# Patient Record
Sex: MALE | Race: WHITE | Employment: OTHER | URBAN - NONMETROPOLITAN AREA
[De-identification: names, ages, dates, MRNs, and addresses within clinical notes are randomized per-mention and may not be internally consistent; named-entity substitution may affect disease eponyms.]

---

## 2020-07-07 ENCOUNTER — HOSPITAL ENCOUNTER (OUTPATIENT)
Dept: NURSING | Age: 75
Discharge: HOME | End: 2020-07-07
Payer: MEDICARE

## 2020-07-07 VITALS
HEART RATE: 67 BPM | DIASTOLIC BLOOD PRESSURE: 79 MMHG | RESPIRATION RATE: 18 BRPM | TEMPERATURE: 97.5 F | SYSTOLIC BLOOD PRESSURE: 138 MMHG

## 2020-07-07 PROCEDURE — 36000 PLACE NEEDLE IN VEIN: CPT

## 2020-07-07 ASSESSMENT — PAIN - FUNCTIONAL ASSESSMENT: PAIN_FUNCTIONAL_ASSESSMENT: 0-10

## 2020-07-07 NOTE — PROGRESS NOTES
___m_ Safety:       (Environmental)   [place] to environment   Ensure ID band is correct and in place/ allergy band as needed   Assess for fall risk   Initiate fall precautions as applicable (fall band, side rails, etc.)   Call light within reach   Bed in low position/ wheels locked    __m__ Pain:        Assess pain level and characteristics   Administer analgesics as ordered   Assess effectiveness of pain management and report to MD as needed    ___m_ Knowledge Deficit:   Assess baseline knowledge   Provide teaching at level of understanding   Provide teaching via preferred learning method   Evaluate teaching effectiveness    _m___ Hemodynamic/Respiratory Status:       (Pre and Post Procedure Monitoring)   Assess/Monitor vital signs and LOC   Assess Baseline SpO2 prior to any sedation   Obtain weight/height   Assess vital signs/ LOC until patient meets discharge criteria   Monitor procedure site and notify MD of any issues    _

## 2020-07-07 NOTE — PROGRESS NOTES
Patient admitted to room endo 2 for INT insertion, vitals are stable. Patient offered rights and responsibilities.

## 2021-12-22 ENCOUNTER — HOSPITAL ENCOUNTER (OUTPATIENT)
Dept: CT IMAGING | Age: 76
Discharge: HOME | End: 2021-12-22

## 2021-12-22 ENCOUNTER — INITIAL CONSULT (OUTPATIENT)
Dept: NEUROLOGY | Age: 76
End: 2021-12-22
Payer: MEDICARE

## 2021-12-22 VITALS
SYSTOLIC BLOOD PRESSURE: 124 MMHG | WEIGHT: 186 LBS | BODY MASS INDEX: 29.89 KG/M2 | OXYGEN SATURATION: 98 % | DIASTOLIC BLOOD PRESSURE: 76 MMHG | HEIGHT: 66 IN | HEART RATE: 68 BPM

## 2021-12-22 DIAGNOSIS — R26.89 BALANCE PROBLEM: ICD-10-CM

## 2021-12-22 DIAGNOSIS — R27.8 SENSORY ATAXIA: ICD-10-CM

## 2021-12-22 DIAGNOSIS — Z00.6 EXAMINATION FOR NORMAL COMPARISON FOR CLINICAL RESEARCH: ICD-10-CM

## 2021-12-22 DIAGNOSIS — R29.6 FREQUENT FALLS: Primary | ICD-10-CM

## 2021-12-22 DIAGNOSIS — R29.2 HYPERREFLEXIA: ICD-10-CM

## 2021-12-22 PROCEDURE — G8417 CALC BMI ABV UP PARAM F/U: HCPCS | Performed by: PSYCHIATRY & NEUROLOGY

## 2021-12-22 PROCEDURE — 99204 OFFICE O/P NEW MOD 45 MIN: CPT | Performed by: PSYCHIATRY & NEUROLOGY

## 2021-12-22 PROCEDURE — G8427 DOCREV CUR MEDS BY ELIG CLIN: HCPCS | Performed by: PSYCHIATRY & NEUROLOGY

## 2021-12-22 PROCEDURE — 4040F PNEUMOC VAC/ADMIN/RCVD: CPT | Performed by: PSYCHIATRY & NEUROLOGY

## 2021-12-22 PROCEDURE — 1123F ACP DISCUSS/DSCN MKR DOCD: CPT | Performed by: PSYCHIATRY & NEUROLOGY

## 2021-12-22 PROCEDURE — 1036F TOBACCO NON-USER: CPT | Performed by: PSYCHIATRY & NEUROLOGY

## 2021-12-22 PROCEDURE — G8484 FLU IMMUNIZE NO ADMIN: HCPCS | Performed by: PSYCHIATRY & NEUROLOGY

## 2021-12-22 RX ORDER — FAMOTIDINE 20 MG/1
20 TABLET, FILM COATED ORAL NIGHTLY
COMMUNITY

## 2021-12-22 RX ORDER — EZETIMIBE 10 MG/1
10 TABLET ORAL DAILY
COMMUNITY
Start: 2021-03-26

## 2021-12-22 RX ORDER — ROSUVASTATIN CALCIUM 40 MG/1
40 TABLET, COATED ORAL DAILY
COMMUNITY

## 2021-12-22 RX ORDER — MIDODRINE HYDROCHLORIDE 10 MG/1
TABLET ORAL
COMMUNITY
Start: 2021-11-15

## 2021-12-22 RX ORDER — TAMSULOSIN HYDROCHLORIDE 0.4 MG/1
0.4 CAPSULE ORAL DAILY
COMMUNITY
Start: 2021-03-28

## 2021-12-22 RX ORDER — ALBUTEROL SULFATE 2.5 MG/3ML
2.5 SOLUTION RESPIRATORY (INHALATION) EVERY 6 HOURS PRN
COMMUNITY

## 2021-12-22 RX ORDER — OMEPRAZOLE 20 MG/1
20 CAPSULE, DELAYED RELEASE ORAL DAILY
COMMUNITY
Start: 2021-03-26

## 2021-12-22 RX ORDER — RANOLAZINE 500 MG/1
500 TABLET, EXTENDED RELEASE ORAL
COMMUNITY
Start: 2021-03-19

## 2021-12-22 RX ORDER — ASPIRIN 81 MG/1
81 TABLET ORAL DAILY
COMMUNITY

## 2021-12-22 RX ORDER — VENLAFAXINE HYDROCHLORIDE 150 MG/1
150 CAPSULE, EXTENDED RELEASE ORAL DAILY
COMMUNITY
Start: 2021-03-28

## 2021-12-22 RX ORDER — POLYETHYLENE GLYCOL 3350 17 G/17G
17 POWDER, FOR SOLUTION ORAL DAILY
COMMUNITY

## 2021-12-22 RX ORDER — FERROUS SULFATE 325(65) MG
325 TABLET ORAL
COMMUNITY

## 2021-12-22 RX ORDER — AMIODARONE HYDROCHLORIDE 200 MG/1
TABLET ORAL
COMMUNITY
Start: 2021-11-09

## 2021-12-22 RX ORDER — FENOFIBRATE 160 MG/1
160 TABLET ORAL DAILY
COMMUNITY
Start: 2021-03-28

## 2021-12-22 RX ORDER — UBIDECARENONE 100 MG
100 CAPSULE ORAL DAILY
COMMUNITY

## 2021-12-22 RX ORDER — BENZONATATE 200 MG/1
CAPSULE ORAL
COMMUNITY
Start: 2021-11-16 | End: 2022-02-25

## 2021-12-22 RX ORDER — NITROGLYCERIN 0.4 MG/1
0.4 TABLET SUBLINGUAL
COMMUNITY

## 2021-12-22 NOTE — PATIENT INSTRUCTIONS
1. Obtain CT head WO contrast images and heart monitor  from [facility]  2. MRI cervical spine WO contrast  3. Tilt table test  4. Vitamin B12, folate  5. Vitamin B6 level  6. Follow through with physical therapy recommendations  7. Call with any new symptoms or concerns. 8. Follow up in 6 weeks.

## 2021-12-28 NOTE — PROGRESS NOTES
Chief Complaint   Patient presents with    Consultation     unsteady gait, falls       Francisco Javier Hsieh is a 67 y.o. male who presents today for evaluation of frequent falls for the past 6 months. When he falls he can fall in all directions. He denies any neck pain. He is not diabetic. No back pain. He denies any dizziness. CT head done at [facility] were normal per patient report. He has been using the walker for the past 1 month and has not fallen when he uses the walker. Last fall was 1 month ago. He denies chest pain. No shortness of breath, no neck pain. He has history of atrial fibrillation, however was taken off his eliquis due to frequent falls. No vision changes. No dysphagia. No fever. No rash. No weight loss. History provided by patient accompanied by his wife. Past Medical History:   Diagnosis Date    COPD (chronic obstructive pulmonary disease) ([facility] [place] [address]     Depression     Hypertension     Prostate cancer ([facility] [place] [address]        There is no problem list on file for this patient.       Allergies   Allergen Reactions    Losartan Other (See Comments)     cough       Current Outpatient Medications   Medication Sig Dispense Refill    PACERONE 200 MG tablet       ascorbic acid (VITAMIN C) 1000 MG tablet Take 1,000 mg by mouth daily      aspirin 81 MG EC tablet Take 81 mg by mouth daily      rosuvastatin (CRESTOR) 40 MG tablet Take 40 mg by mouth daily      benzonatate (TESSALON) 200 MG capsule take 1 capsule by mouth every 8 hours if needed for cough for 10 days      coenzyme Q10 100 MG CAPS capsule Take 100 mg by mouth daily      ezetimibe (ZETIA) 10 MG tablet Take 10 mg by mouth daily      famotidine (PEPCID) 20 MG tablet Take 20 mg by mouth nightly      fenofibrate (TRIGLIDE) 160 MG tablet Take 160 mg by mouth daily      ferrous sulfate (IRON 325) 325 (65 Fe) MG tablet Take 325 mg by mouth daily (with breakfast)      tamsulosin (FLOMAX) 0.4 MG capsule Take 0.4 mg by mouth daily      venlafaxine (EFFEXOR XR) 150 MG extended release capsule Take 150 mg by mouth daily      ranolazine (RANEXA) 500 MG extended release tablet Take 500 mg by mouth      polyethylene glycol (GLYCOLAX) 17 GM/SCOOP powder Take 17 g by mouth daily      omeprazole (PRILOSEC) 20 MG delayed release capsule Take 20 mg by mouth daily      nitroGLYCERIN (NITROSTAT) 0.4 MG SL tablet Place 0.4 mg under the tongue      midodrine (PROAMATINE) 10 MG tablet take 1 tablet by mouth three times a day      albuterol (PROVENTIL) (2.5 MG/3ML) 0.083% nebulizer solution Take 2.5 mg by nebulization every 6 hours as needed for Wheezing      Chlorpheniramine-Acetaminophen (CORICIDIN HBP COLD/FLU PO) Take by mouth       No current facility-administered medications for this visit. Social History     Socioeconomic History    Marital status:      Spouse name: None    Number of children: None    Years of education: None    Highest education level: None   Occupational History    None   Tobacco Use    Smoking status: Former Smoker     Types: Pipe     Quit date: 1984     Years since quittin.9    Smokeless tobacco: Never Used   Vaping Use    Vaping Use: Never used   Substance and Sexual Activity    Alcohol use: Yes     Comment: socially     Drug use: Never    Sexual activity: Yes     Partners: Female   Other Topics Concern    None   Social History Narrative    None     Social Determinants of Health     Financial Resource Strain:     Difficulty of Paying Living Expenses: Not on file   Food Insecurity:     Worried About Running Out of Food in the Last Year: Not on file    [place] of Food in the Last Year: Not on file   Transportation Needs:     Lack of Transportation (Medical): Not on file    Lack of Transportation (Non-Medical):  Not on file   Physical Activity:     Days of Exercise per Week: Not on file    Minutes of Exercise per Session: Not on file   Stress:     Feeling of Stress : Not on file   Social Connections:  Frequency of Communication with Friends and Family: Not on file    Frequency of Social Gatherings with Friends and Family: Not on file    Attends Advent Services: Not on file    Active Member of Clubs or Organizations: Not on file    Attends Club or Organization Meetings: Not on file    Marital Status: Not on file   Intimate Partner Violence:     Fear of Current or Ex-Partner: Not on file    Emotionally Abused: Not on file    Physically Abused: Not on file    Sexually Abused: Not on file   Housing Stability:     Unable to Pay for Housing in the Last Year: Not on file    Number of [place] in the Last Year: Not on file    Unstable Housing in the Last Year: Not on file       Family History   Problem Relation Age of Onset    Depression Mother     Heart Disease Father     No Known Problems Sister     Cancer Brother         bladder         I reviewed the past medical history, allergies, medications, social history and family history. Review of Systems   All systems reviewed, and are all negative, except what is mentioned in HPI      Vitals:    12/22/21 1040   BP: 124/76   Site: Left Upper Arm   Position: Sitting   Cuff Size: Large Adult   Pulse: 68   SpO2: 98%   Weight: 186 lb (84.4 kg)   Height: 5' 6\" (1.676 m)       Physical Examination:  General appearance - alert, well appearing, and in no distress, oriented to person, place, and time and normal weight  Mental status- Level of Alertness: awake  Orientation: person, place, time  Memory: normal  Fund of Knowledge: normal  Attention/Concentration: normal  Language: normal. Mood is normal.   Neck - supple, no significant adenopathy, carotids upstroke normal bilaterally. There is no axillary lymphadenopathy. There is no carotid bruit. No neck lymphadenopathy.  No thyroid enlargement   Neurological -   Cranial [place]-OF-TWX:.   Cranial nerve II: Normal   Cranial nerve III: Pupils: equal, round, reactive to light  Cranial nerves III, IV, VI: Extraocular Movements: intact   Cranial nerve V: Facial sensation: intact   Cranial nerve VII:Facial strength: intact   Cranial nerve VIII: Hearing: intact   Cranial nerve IX: Palate Elevation intact bilaterally  Cranial nerve XI: Shoulder shrug intact bilaterally  Cranial nerve XII: Tongue midline   neck supple without rigidity, there is limitation of range of motion of the neck worse to the left. DTR's are brisk distal and symmetric  Babinski sign negative   Motor exam is 5/5 in the upper and lower extremities. Normal muscle tone. There is no muscle atrophy. Sensory is intact for light touch. Coordination: finger to nose,  intact  Gait and station intact   Abnormal movement none, vibration absent distally  Skin - warm, dry to touch, normal coloration, no rashes, no suspicious skin lesions  Superficial temporal artery pulses are normal.   There is no limitation of range of motion of the neck. There is no resting tremor, no pin rolling, no bradykinesia, no Hypohonia, normal blink rate. Musculoskeletal: Has no hand arthritis, no limitation of ROM in any of the four extremities. There is no leg edema. The Heart was regular in rate and rhythm. No heart murmur  Chest Clear, with  good effort. Abdomen soft, intact bowel sounds. We reviewed the patient records from referring provider and available information in the EHR       ASSESSMENT:      Diagnosis Orders   1. Frequent falls     2. Balance problem     3. Hyperreflexia     4. Sensory ataxia        This is a 67-year-old male who presents with frequent falls been ongoing for the past 6 months. When he falls he falls in all directions. I reviewed the CT Brain and agree with interpretation, I also reviewed the patient pertinent labs and records in the EHR and from other providers. CT head done at [facility] was normal per his report, we will obtain the report and images for further review.  On exam, he has reduced vibratory sensation, there is limited range of motion of the neck, and he is hyperreflexic. There may be an element of sensory ataxia contributing to his symptoms. We will arrange for him to undergo an MRI of the cervical spine without contrast to evaluate for cervical myelopathy as well as a tilt table test to screen for orthostatic hypotension/vasovagal syncope. After detailed discussion with the patient and his wife we agreed on the following plan. Plan    1. Obtain CT head WO contrast images and heart monitor  from [facility]  2. MRI cervical spine WO contrast  3. Tilt table test  4. Vitamin B12, folate  5. Vitamin B6 level  6. Follow through with physical therapy recommendations  7. Call with any new symptoms or concerns. 8. Follow up in 6 weeks.      Total time 62 min      Ellie Ibarra MD

## 2022-01-18 ENCOUNTER — HOSPITAL ENCOUNTER (OUTPATIENT)
Dept: NON INVASIVE DIAGNOSTICS | Age: 77
Discharge: HOME | End: 2022-01-18
Payer: MEDICARE

## 2022-01-18 ENCOUNTER — HOSPITAL ENCOUNTER (OUTPATIENT)
Dept: MRI IMAGING | Age: 77
Discharge: HOME | End: 2022-01-18
Payer: MEDICARE

## 2022-01-18 ENCOUNTER — TELEPHONE (OUTPATIENT)
Dept: NEUROLOGY | Age: 77
End: 2022-01-18

## 2022-01-18 VITALS — BODY MASS INDEX: 28.93 KG/M2 | WEIGHT: 180 LBS | HEIGHT: 66 IN

## 2022-01-18 DIAGNOSIS — R93.7 ABNORMAL MRI, CERVICAL SPINE: ICD-10-CM

## 2022-01-18 DIAGNOSIS — R29.2 HYPERREFLEXIA: ICD-10-CM

## 2022-01-18 DIAGNOSIS — R27.8 SENSORY ATAXIA: ICD-10-CM

## 2022-01-18 DIAGNOSIS — R26.89 BALANCE PROBLEM: ICD-10-CM

## 2022-01-18 DIAGNOSIS — R29.6 FREQUENT FALLS: ICD-10-CM

## 2022-01-18 DIAGNOSIS — R26.89 BALANCE PROBLEM: Primary | ICD-10-CM

## 2022-01-18 PROCEDURE — 93660 TILT TABLE EVALUATION: CPT | Performed by: INTERNAL MEDICINE

## 2022-01-18 PROCEDURE — 72141 MRI NECK SPINE W/O DYE: CPT

## 2022-01-18 NOTE — TELEPHONE ENCOUNTER
----- Message from MARIE Mcintyre - CNP sent at 1/18/2022 11:39 AM EST -----  Please let patient know he needs to be seen by spine specialist re: mri cervical spine showing C4-5, at which level there is moderate canal stenosis, cord compression, moderate to severe left and moderate right-sided foraminal stenosis. There is   increased signal intensity within the cervical spinal cord, just below this level consistent with myelomalacia secondary to chronic cord compression. Does he have a preference?   Pastor Schmidt, CNP

## 2022-01-18 NOTE — PROCEDURES
[address] [id number]                                TILT TABLE TEST    PATIENT NAME: Isai Lopez                   :        1945  MED REC NO:   [id number]                           ROOM:  ACCOUNT NO:   [de-identified]                           ADMIT DATE: 2022  PROVIDER:     Kali Armendariz MD    DATE OF STUDY:  2022    PROCEDURE:  Tilt table study. INDICATION FOR STUDY:  Presyncopal episodes. DESCRIPTION OF PROCEDURE:  After informed consent was obtained, the  patient was brought to the electrophysiology laboratory in fasting,  nonsedated state. His resting blood pressure was 137/85 with a heart  rate of 62 beats per minute with EKG showing normal sinus rhythm. Under  continuous blood pressure, EKG and heart rate monitoring, his bed was  tilted to about 70 degrees. Immediately after tilt, his blood pressure  was 105/68 with a heart rate of 65 beats per minute. During the next  ten minutes, blood pressure remained stable. At ten minutes of tilt  table study, his blood pressure was 122/77 with a heart rate of 65 beats  per minute. During the 20 minutes of tilt table study, his blood  pressure was 113/69 with a heart rate of 69 minutes per minute. At 30  minutes of tilt table study, his blood pressure was 119/79 with a heart  rate of 71 beats per minutes. During the entire 30 minutes of tilt  table study, the patient did not have any complaints of dizziness,  lightheadedness or other presyncopal episodes. SUMMARY:  This is a negative tilt table study for vasodepressive or  cardioinhibitory symptoms. Clinical correlation is necessary.         Sharri Bartlett MD    D: 2022 14:46:13       T: 2022 15:22:24     [email or web address]  Job#: [id number]     Doc#: [id number]    CC:

## 2022-01-19 NOTE — TELEPHONE ENCOUNTER
Spoke with patient who requested Tiffanie Wright at [facility]. Please advise. Thank you.

## 2022-02-25 ENCOUNTER — OFFICE VISIT (OUTPATIENT)
Dept: NEUROLOGY | Age: 77
End: 2022-02-25
Payer: MEDICARE

## 2022-02-25 VITALS
HEART RATE: 69 BPM | DIASTOLIC BLOOD PRESSURE: 72 MMHG | WEIGHT: 185 LBS | SYSTOLIC BLOOD PRESSURE: 118 MMHG | BODY MASS INDEX: 29.73 KG/M2 | HEIGHT: 66 IN

## 2022-02-25 DIAGNOSIS — R27.8 SENSORY ATAXIA: ICD-10-CM

## 2022-02-25 DIAGNOSIS — R29.6 FREQUENT FALLS: ICD-10-CM

## 2022-02-25 DIAGNOSIS — R93.7 ABNORMAL MRI, CERVICAL SPINE: ICD-10-CM

## 2022-02-25 DIAGNOSIS — R29.2 HYPERREFLEXIA: ICD-10-CM

## 2022-02-25 DIAGNOSIS — R26.89 BALANCE PROBLEM: Primary | ICD-10-CM

## 2022-02-25 PROCEDURE — G8427 DOCREV CUR MEDS BY ELIG CLIN: HCPCS | Performed by: NURSE PRACTITIONER

## 2022-02-25 PROCEDURE — G8484 FLU IMMUNIZE NO ADMIN: HCPCS | Performed by: NURSE PRACTITIONER

## 2022-02-25 PROCEDURE — 99213 OFFICE O/P EST LOW 20 MIN: CPT | Performed by: NURSE PRACTITIONER

## 2022-02-25 PROCEDURE — 1036F TOBACCO NON-USER: CPT | Performed by: NURSE PRACTITIONER

## 2022-02-25 PROCEDURE — G8417 CALC BMI ABV UP PARAM F/U: HCPCS | Performed by: NURSE PRACTITIONER

## 2022-02-25 PROCEDURE — 1123F ACP DISCUSS/DSCN MKR DOCD: CPT | Performed by: NURSE PRACTITIONER

## 2022-02-25 PROCEDURE — 4040F PNEUMOC VAC/ADMIN/RCVD: CPT | Performed by: NURSE PRACTITIONER

## 2022-02-25 RX ORDER — LANOLIN ALCOHOL/MO/W.PET/CERES
1000 CREAM (GRAM) TOPICAL DAILY
COMMUNITY

## 2022-02-25 RX ORDER — VITAMIN E 268 MG
400 CAPSULE ORAL DAILY
COMMUNITY

## 2022-02-25 RX ORDER — MULTIVIT-MIN/IRON/FOLIC/HRB186 3.3 MG-25
TABLET ORAL
COMMUNITY

## 2022-02-25 RX ORDER — CALCIUM CARBONATE 500(1250)
500 TABLET ORAL DAILY
COMMUNITY

## 2022-02-25 NOTE — PROGRESS NOTES
NEUROLOGY OUT PATIENT FOLLOW UP NOTE:  2/25/202210:45 AM    Negrita Soriano is here for follow up for frequent falls, balance trouble, hyperreflexia, sensory ataxia. ROS:  Respiratory : no cough, no shortness of breath  Cardiac: no chest pain. No palpitations.   Renal : no flank pain, no hematuria    Skin: no rash      Allergies   Allergen Reactions    Losartan Other (See Comments)     cough       Current Outpatient Medications:     vitamin B-12 (CYANOCOBALAMIN) 1000 MCG tablet, Take 1,000 mcg by mouth daily, Disp: , Rfl:     Cholecalciferol (VITAMIN D3) 125 MCG (5000 UT) TABS, Take by mouth, Disp: , Rfl:     vitamin E 400 UNIT capsule, Take 400 Units by mouth daily, Disp: , Rfl:     calcium carbonate (OSCAL) 500 MG TABS tablet, Take 500 mg by mouth daily, Disp: , Rfl:     Misc Natural Products (GLUCOSAMINE CHOND CMP ADVANCED PO), Take by mouth, Disp: , Rfl:     Multiple Vitamins-Minerals (HAIR SKIN & NAILS ADVANCED) TABS, Take by mouth, Disp: , Rfl:     PACERONE 200 MG tablet, , Disp: , Rfl:     ascorbic acid (VITAMIN C) 1000 MG tablet, Take 1,000 mg by mouth daily, Disp: , Rfl:     aspirin 81 MG EC tablet, Take 81 mg by mouth daily, Disp: , Rfl:     rosuvastatin (CRESTOR) 40 MG tablet, Take 40 mg by mouth daily, Disp: , Rfl:     coenzyme Q10 100 MG CAPS capsule, Take 100 mg by mouth daily, Disp: , Rfl:     ezetimibe (ZETIA) 10 MG tablet, Take 10 mg by mouth daily, Disp: , Rfl:     famotidine (PEPCID) 20 MG tablet, Take 20 mg by mouth nightly, Disp: , Rfl:     fenofibrate (TRIGLIDE) 160 MG tablet, Take 160 mg by mouth daily, Disp: , Rfl:     ferrous sulfate (IRON 325) 325 (65 Fe) MG tablet, Take 325 mg by mouth daily (with breakfast), Disp: , Rfl:     tamsulosin (FLOMAX) 0.4 MG capsule, Take 0.4 mg by mouth daily, Disp: , Rfl:     venlafaxine (EFFEXOR XR) 150 MG extended release capsule, Take 150 mg by mouth daily, Disp: , Rfl:     ranolazine (RANEXA) 500 MG extended release tablet, Take 500 mg by mouth, Disp: , Rfl:     polyethylene glycol (GLYCOLAX) 17 GM/SCOOP powder, Take 17 g by mouth daily, Disp: , Rfl:     omeprazole (PRILOSEC) 20 MG delayed release capsule, Take 20 mg by mouth daily, Disp: , Rfl:     nitroGLYCERIN (NITROSTAT) 0.4 MG SL tablet, Place 0.4 mg under the tongue, Disp: , Rfl:     midodrine (PROAMATINE) 10 MG tablet, take 1 tablet by mouth three times a day, Disp: , Rfl:     albuterol (PROVENTIL) (2.5 MG/3ML) 0.083% nebulizer solution, Take 2.5 mg by nebulization every 6 hours as needed for Wheezing, Disp: , Rfl:     Chlorpheniramine-Acetaminophen (CORICIDIN HBP COLD/FLU PO), Take by mouth, Disp: , Rfl:     I reviewed the past medical history, allergies, medications, social history and family history. PE:   Vitals:    02/25/22 1025   BP: 118/72   Pulse: 69   Weight: 185 lb (83.9 kg)   Height: 5' 6\" (1.676 m)     General Appearance:  awake, alert, oriented, in no acute distress  Gen: NAD, Language is Intact. Skin: no rash, lesion, dry  to touch. warm  Head: no rash, no icterus  Neck: There is no carotid bruits. The Neck is supple, there is limitation of range of motion of the neck worse to the left. . There is no neck lymphadenopathy. Neuro: CN 2-12 grossly intact with no focal deficits. Power 5/5 Throughout symmetric, Reflexes are brisk symmetric. Long tracts are reduced distally. Cerebellar exam is Intact. Sensory exam is intact to light touch. Gait is intact, he is using a cane. Musculoskeletal:  Has no hand arthritis, no limitation of ROM in any of the four extremities  Lower extremities no edema          DATA:            Results for orders placed during the hospital encounter of 01/18/22    MRI CERVICAL SPINE WO CONTRAST    Narrative  PROCEDURE: MRI CERVICAL SPINE WO CONTRAST    CLINICAL INFORMATION: Balance problem, Hyperreflexia, Frequent falls, Sensory ataxia . COMPARISON: CT scan of the cervical spine dated 11 November 2021. .    TECHNIQUE: Sagittal T1, T2 and STIR sequences were obtained through the cervical spine. Axial fast and echo and gradient echo T2-weighted images were obtained. FINDINGS:        There is straightening of the normal cervical lordosis. . There is degenerative change in the vertebral body endplates adjacent to the C4-5 and C5-C6 disc spaces. . There is some soft tissue thickening behind the dens. .  The facet joints are normally  aligned. There is an area of increased signal intensity within the cervical spinal cord just below the C4-5 disc space. . This is suggestive of myelomalacia secondary to chronic cord compression. The remaining cervical spinal cord is normal. There are areas of  increased signal intensity in the rod, possibly representing ischemic changes. On the axial images, at C2-C3, there is no disc herniation, canal stenosis, cord compression or foraminal stenosis. At C3-C4, there is a 1 mm bulging disc. This causes mild canal stenosis with no cord compression. There is mild-to-moderate right and mild left-sided foraminal stenosis. At C4-C5, there is a 3.4 mm ventral extradural defect. This results in moderate canal stenosis, cord compression, moderate to severe left and moderate right-sided foraminal stenosis. At C5-C6, there is a 1.3 mm ventral extradural defect secondary to bulging disc and uncinate process hypertrophy. This causes mild canal stenosis with no cord compression. There is mild-to-moderate bilateral foraminal stenosis. At C6-C7, there is a 1.4 mm ventral extradural defect secondary bulging disc and uncinate process hypertrophy. This causes mild canal stenosis with no cord compression. There is mild-to-moderate bilateral from stenosis. At C7-T1, there is a 1.6 mm ventral extradural defect secondary bulging disc and uncinate process hypertrophy. This causes mild canal stenosis with no cord compression, moderate. left and mild right-sided foraminal stenosis.     There are no suspicious findings in the cervical soft tissues. Impression  1. Straightening of the normal cervical lordosis with superimposed degenerative changes most marked at C4-5, at which level there is moderate canal stenosis, cord compression, moderate to severe left and moderate right-sided foraminal stenosis. There is  increased signal intensity within the cervical spinal cord, just below this level consistent with myelomalacia secondary to chronic cord compression. 2. There is mild canal stenosis with no cord compression, moderate left and mild right-sided foraminal stenosis at C7-T1.  3. There is mild canal stenosis with no cord compression and mild-to-moderate bilateral foraminal stenosis at C5-6 and C6-7.  4. There is mild canal stenosis with no cord compression, moderate right and mild left-sided from stenosis at C3-4.  5. There is some soft tissue thickening behind the dens. 6. There is slightly increased signal intensity in the rod, possibly representing ischemic changes. .          **This report has been created using voice recognition software. It may contain minor errors which are inherent in voice recognition technology. **    Final report electronically signed by DR Kacey Escobar on 1/18/2022 11:15 AM    Tilt table 1/18/22:  SUMMARY:  This is a negative tilt table study for vasodepressive or  cardioinhibitory symptoms.     Clinical correlation is necessary.           Claire Vásquez MD         Assessment:     Diagnosis Orders   1. Balance problem     2. Frequent falls     3. Hyperreflexia     4. Sensory ataxia     5. Abnormal MRI, cervical spine          He is doing better. He has not had any falls since he started using the cane and walker. He had MRI cervical spine  showing C4-5, at which level there is moderate canal stenosis, cord compression, moderate to severe left and moderate right-sided foraminal stenosis.  There is increased signal intensity within the cervical spinal cord, just below this level consistent with myelomalacia secondary to chronic cord compression. He is following with spine specialist and is scheduled to undergo neck surgery at the beginning of April. He did have tilt table test done that showed  No concerning findings. His lab work done checking vitamin levels was within acceptable range. He was counseled on the importance of using cane/walker at all times. After detailed discussion with patient and his wife we agreed on the following plan. Plan:  1. Continue using cane/ walker at all times  2. Follow through with spine specialist recommendations  3. Follow through with physical therapy recommendations  4. Report any new symptoms  5. Follow up in 3 months or sooner if needed. 6. Call if any questions or concerns.     Total time 26 min    MARIE Ray - CNP

## 2022-02-25 NOTE — PATIENT INSTRUCTIONS
1. Continue using cane/ walker at all times  2. Follow through with spine specialist recommendations  3. Follow through with physical therapy recommendations  4. Report any new symptoms  5. Follow up in 3 months or sooner if needed. 6. Call if any questions or concerns.

## 2022-06-17 ENCOUNTER — OFFICE VISIT (OUTPATIENT)
Dept: NEUROLOGY | Age: 77
End: 2022-06-17
Payer: MEDICARE

## 2022-06-17 VITALS
DIASTOLIC BLOOD PRESSURE: 62 MMHG | HEIGHT: 66 IN | OXYGEN SATURATION: 96 % | WEIGHT: 191 LBS | HEART RATE: 62 BPM | BODY MASS INDEX: 30.7 KG/M2 | SYSTOLIC BLOOD PRESSURE: 120 MMHG

## 2022-06-17 DIAGNOSIS — R93.7 ABNORMAL MRI, CERVICAL SPINE: ICD-10-CM

## 2022-06-17 DIAGNOSIS — R26.89 BALANCE PROBLEM: Primary | ICD-10-CM

## 2022-06-17 DIAGNOSIS — R27.8 SENSORY ATAXIA: ICD-10-CM

## 2022-06-17 DIAGNOSIS — R29.6 FREQUENT FALLS: ICD-10-CM

## 2022-06-17 DIAGNOSIS — R29.2 HYPERREFLEXIA: ICD-10-CM

## 2022-06-17 PROCEDURE — 1123F ACP DISCUSS/DSCN MKR DOCD: CPT | Performed by: PSYCHIATRY & NEUROLOGY

## 2022-06-17 PROCEDURE — G8427 DOCREV CUR MEDS BY ELIG CLIN: HCPCS | Performed by: PSYCHIATRY & NEUROLOGY

## 2022-06-17 PROCEDURE — G8417 CALC BMI ABV UP PARAM F/U: HCPCS | Performed by: PSYCHIATRY & NEUROLOGY

## 2022-06-17 PROCEDURE — 1036F TOBACCO NON-USER: CPT | Performed by: PSYCHIATRY & NEUROLOGY

## 2022-06-17 PROCEDURE — 99213 OFFICE O/P EST LOW 20 MIN: CPT | Performed by: PSYCHIATRY & NEUROLOGY

## 2022-06-17 NOTE — PROGRESS NOTES
NEUROLOGY OUT PATIENT FOLLOW UP NOTE:  6/17/20222:21 PM    Gabbi Marques is here for follow up for frequent falls, balance trouble, hyperreflexia, sensory ataxia.                Allergies   Allergen Reactions    Losartan Other (See Comments)     cough       Current Outpatient Medications:     vitamin B-12 (CYANOCOBALAMIN) 1000 MCG tablet, Take 1,000 mcg by mouth daily, Disp: , Rfl:     Cholecalciferol (VITAMIN D3) 125 MCG (5000 UT) TABS, Take by mouth, Disp: , Rfl:     vitamin E 400 UNIT capsule, Take 400 Units by mouth daily, Disp: , Rfl:     calcium carbonate (OSCAL) 500 MG TABS tablet, Take 500 mg by mouth daily, Disp: , Rfl:     Misc Natural Products (GLUCOSAMINE CHOND CMP ADVANCED PO), Take by mouth, Disp: , Rfl:     Multiple Vitamins-Minerals (HAIR SKIN & NAILS ADVANCED) TABS, Take by mouth, Disp: , Rfl:     PACERONE 200 MG tablet, , Disp: , Rfl:     ascorbic acid (VITAMIN C) 1000 MG tablet, Take 1,000 mg by mouth daily, Disp: , Rfl:     aspirin 81 MG EC tablet, Take 81 mg by mouth daily, Disp: , Rfl:     rosuvastatin (CRESTOR) 40 MG tablet, Take 40 mg by mouth daily, Disp: , Rfl:     coenzyme Q10 100 MG CAPS capsule, Take 100 mg by mouth daily, Disp: , Rfl:     ezetimibe (ZETIA) 10 MG tablet, Take 10 mg by mouth daily, Disp: , Rfl:     famotidine (PEPCID) 20 MG tablet, Take 20 mg by mouth nightly, Disp: , Rfl:     fenofibrate (TRIGLIDE) 160 MG tablet, Take 160 mg by mouth daily, Disp: , Rfl:     ferrous sulfate (IRON 325) 325 (65 Fe) MG tablet, Take 325 mg by mouth daily (with breakfast), Disp: , Rfl:     tamsulosin (FLOMAX) 0.4 MG capsule, Take 0.4 mg by mouth daily, Disp: , Rfl:     venlafaxine (EFFEXOR XR) 150 MG extended release capsule, Take 150 mg by mouth daily, Disp: , Rfl:     ranolazine (RANEXA) 500 MG extended release tablet, Take 500 mg by mouth, Disp: , Rfl:     polyethylene glycol (GLYCOLAX) 17 GM/SCOOP powder, Take 17 g by mouth daily, Disp: , Rfl:     omeprazole (PRILOSEC) 20 MG delayed release capsule, Take 20 mg by mouth daily, Disp: , Rfl:     nitroGLYCERIN (NITROSTAT) 0.4 MG SL tablet, Place 0.4 mg under the tongue, Disp: , Rfl:     midodrine (PROAMATINE) 10 MG tablet, take 1 tablet by mouth three times a day, Disp: , Rfl:     albuterol (PROVENTIL) (2.5 MG/3ML) 0.083% nebulizer solution, Take 2.5 mg by nebulization every 6 hours as needed for Wheezing, Disp: , Rfl:     Chlorpheniramine-Acetaminophen (CORICIDIN HBP COLD/FLU PO), Take by mouth, Disp: , Rfl:     I reviewed the past medical history, allergies, medications, social history and family history. PE:   Vitals:    06/17/22 1410   BP: 120/62   Pulse: 62   SpO2: 96%   Weight: 191 lb (86.6 kg)   Height: 5' 6\" (1.676 m)     General Appearance:  awake, alert, oriented, in no acute distress  Gen: NAD, Language is Intact. Skin: no rash, lesion, dry  to touch. warm  Head: no rash, no icterus  Neck: There is no carotid bruits. The Neck is supple, there is limitation of range of motion of the neck worse to the left. . There is no neck lymphadenopathy. Neuro: CN 2-12 grossly intact with no focal deficits. Power 5/5 Throughout symmetric, Reflexes are brisk symmetric. Long tracts are decreased distal. Cerebellar exam is Intact. Sensory exam is intact to light touch. Gait is steady using cane. Musculoskeletal:  Has no hand arthritis, no limitation of ROM in any of the four extremities  Lower extremities no edema          DATA:            Results for orders placed during the hospital encounter of 01/18/22    MRI CERVICAL SPINE WO CONTRAST    Narrative  PROCEDURE: MRI CERVICAL SPINE WO CONTRAST    CLINICAL INFORMATION: Balance problem, Hyperreflexia, Frequent falls, Sensory ataxia . COMPARISON: CT scan of the cervical spine dated 11 November 2021. .    TECHNIQUE: Sagittal T1, T2 and STIR sequences were obtained through the cervical spine.  Axial fast and echo and gradient echo T2-weighted images were obtained. FINDINGS:        There is straightening of the normal cervical lordosis. . There is degenerative change in the vertebral body endplates adjacent to the C4-5 and C5-C6 disc spaces. . There is some soft tissue thickening behind the dens. .  The facet joints are normally  aligned. There is an area of increased signal intensity within the cervical spinal cord just below the C4-5 disc space. . This is suggestive of myelomalacia secondary to chronic cord compression. The remaining cervical spinal cord is normal. There are areas of  increased signal intensity in the rod, possibly representing ischemic changes. On the axial images, at C2-C3, there is no disc herniation, canal stenosis, cord compression or foraminal stenosis. At C3-C4, there is a 1 mm bulging disc. This causes mild canal stenosis with no cord compression. There is mild-to-moderate right and mild left-sided foraminal stenosis. At C4-C5, there is a 3.4 mm ventral extradural defect. This results in moderate canal stenosis, cord compression, moderate to severe left and moderate right-sided foraminal stenosis. At C5-C6, there is a 1.3 mm ventral extradural defect secondary to bulging disc and uncinate process hypertrophy. This causes mild canal stenosis with no cord compression. There is mild-to-moderate bilateral foraminal stenosis. At C6-C7, there is a 1.4 mm ventral extradural defect secondary bulging disc and uncinate process hypertrophy. This causes mild canal stenosis with no cord compression. There is mild-to-moderate bilateral from stenosis. At C7-T1, there is a 1.6 mm ventral extradural defect secondary bulging disc and uncinate process hypertrophy. This causes mild canal stenosis with no cord compression, moderate. left and mild right-sided foraminal stenosis. There are no suspicious findings in the cervical soft tissues. Impression  1.  Straightening of the normal cervical lordosis with superimposed degenerative changes most marked at C4-5, at which level there is moderate canal stenosis, cord compression, moderate to severe left and moderate right-sided foraminal stenosis. There is  increased signal intensity within the cervical spinal cord, just below this level consistent with myelomalacia secondary to chronic cord compression. 2. There is mild canal stenosis with no cord compression, moderate left and mild right-sided foraminal stenosis at C7-T1.  3. There is mild canal stenosis with no cord compression and mild-to-moderate bilateral foraminal stenosis at C5-6 and C6-7.  4. There is mild canal stenosis with no cord compression, moderate right and mild left-sided from stenosis at C3-4.  5. There is some soft tissue thickening behind the dens. 6. There is slightly increased signal intensity in the rod, possibly representing ischemic changes. .          **This report has been created using voice recognition software. It may contain minor errors which are inherent in voice recognition technology. **    Final report electronically signed by DR Denia Fitzpatrick on 1/18/2022 11:15 AM    Tilt table 1/18/22:  SUMMARY:  This is a negative tilt table study for vasodepressive or  cardioinhibitory symptoms.     Clinical correlation is necessary.           Nick Hwang MD         Assessment:     Diagnosis Orders   1. Balance problem     2. Frequent falls     3. Hyperreflexia     4. Sensory ataxia     5. Abnormal MRI, cervical spine        Follow up for balance problem. No falls since the surgery, he uses the walker more than the cane. He is to start Therapy again next week. He had clearance from the spine surgeon. No pain in the surgery site. Exam is better with his ambulation. After detailed discussion with patient and his wife we agreed on the following plan. Plan:  1. Continue using cane/ walker . 2. Follow through with spine specialist recommendations  3. Follow through with physical therapy recommendations  4.   Follow up as needed. 5. Call if any questions or concerns.     Total time 22 min    Justin Armstrong MD

## 2022-06-17 NOTE — PATIENT INSTRUCTIONS
1. Continue using cane/ walker . 2. Follow through with spine specialist recommendations  3. Follow through with physical therapy recommendations  4. Follow up as needed. 5. Call if any questions or concerns.

## 2023-07-25 ENCOUNTER — HOSPITAL ENCOUNTER (INPATIENT)
Age: 78
LOS: 2 days | Discharge: PSYCHIATRIC OR SUBSTANCE USE FACILITY | DRG: 918 | End: 2023-07-27
Attending: FAMILY MEDICINE | Admitting: HOSPITALIST
Payer: MEDICARE

## 2023-07-25 DIAGNOSIS — T14.91XA SUICIDE ATTEMPT (HCC): Primary | ICD-10-CM

## 2023-07-25 DIAGNOSIS — R94.31 ABNORMAL EKG: ICD-10-CM

## 2023-07-25 DIAGNOSIS — T40.422A: ICD-10-CM

## 2023-07-25 PROBLEM — T50.902A DRUG OVERDOSE, INTENTIONAL SELF-HARM, INITIAL ENCOUNTER (HCC): Status: ACTIVE | Noted: 2023-07-25

## 2023-07-25 LAB
ALBUMIN SERPL BCG-MCNC: 4 G/DL (ref 3.5–5.1)
ALP SERPL-CCNC: 54 U/L (ref 38–126)
ALT SERPL W/O P-5'-P-CCNC: 25 U/L (ref 11–66)
AMPHETAMINES UR QL SCN: NEGATIVE
ANION GAP SERPL CALC-SCNC: 11 MEQ/L (ref 8–16)
APAP SERPL-MCNC: < 5 UG/ML (ref 0–20)
AST SERPL-CCNC: 40 U/L (ref 5–40)
BARBITURATES UR QL SCN: NEGATIVE
BASOPHILS ABSOLUTE: 0 THOU/MM3 (ref 0–0.1)
BASOPHILS NFR BLD AUTO: 0.3 %
BENZODIAZ UR QL SCN: NEGATIVE
BILIRUB CONJ SERPL-MCNC: < 0.2 MG/DL (ref 0–0.3)
BILIRUB SERPL-MCNC: 0.4 MG/DL (ref 0.3–1.2)
BUN SERPL-MCNC: 19 MG/DL (ref 7–22)
BZE UR QL SCN: NEGATIVE
CALCIUM SERPL-MCNC: 9.4 MG/DL (ref 8.5–10.5)
CANNABINOIDS UR QL SCN: NEGATIVE
CHLORIDE SERPL-SCNC: 103 MEQ/L (ref 98–111)
CO2 SERPL-SCNC: 27 MEQ/L (ref 23–33)
CREAT SERPL-MCNC: 1.3 MG/DL (ref 0.4–1.2)
DEPRECATED RDW RBC AUTO: 48 FL (ref 35–45)
EKG ATRIAL RATE: 59 BPM
EKG P AXIS: 66 DEGREES
EKG P-R INTERVAL: 210 MS
EKG Q-T INTERVAL: 462 MS
EKG QRS DURATION: 102 MS
EKG QTC CALCULATION (BAZETT): 457 MS
EKG R AXIS: 17 DEGREES
EKG T AXIS: 74 DEGREES
EKG VENTRICULAR RATE: 59 BPM
EOSINOPHIL NFR BLD AUTO: 2.5 %
EOSINOPHILS ABSOLUTE: 0.1 THOU/MM3 (ref 0–0.4)
ERYTHROCYTE [DISTWIDTH] IN BLOOD BY AUTOMATED COUNT: 13.8 % (ref 11.5–14.5)
ETHANOL SERPL-MCNC: < 0.01 %
FENTANYL: NEGATIVE
GFR SERPL CREATININE-BSD FRML MDRD: 56 ML/MIN/1.73M2
GLUCOSE SERPL-MCNC: 88 MG/DL (ref 70–108)
HCT VFR BLD AUTO: 36.9 % (ref 42–52)
HGB BLD-MCNC: 12.2 GM/DL (ref 14–18)
IMM GRANULOCYTES # BLD AUTO: 0.01 THOU/MM3 (ref 0–0.07)
IMM GRANULOCYTES NFR BLD AUTO: 0.3 %
LACTATE SERPL-SCNC: 1.1 MMOL/L (ref 0.5–2)
LIPASE SERPL-CCNC: 17.4 U/L (ref 5.6–51.3)
LYMPHOCYTES ABSOLUTE: 0.8 THOU/MM3 (ref 1–4.8)
LYMPHOCYTES NFR BLD AUTO: 26.1 %
MCH RBC QN AUTO: 31 PG (ref 26–33)
MCHC RBC AUTO-ENTMCNC: 33.1 GM/DL (ref 32.2–35.5)
MCV RBC AUTO: 93.9 FL (ref 80–94)
MONOCYTES ABSOLUTE: 0.4 THOU/MM3 (ref 0.4–1.3)
MONOCYTES NFR BLD AUTO: 11.6 %
NEUTROPHILS NFR BLD AUTO: 59.2 %
NRBC BLD AUTO-RTO: 0 /100 WBC
OPIATES UR QL SCN: NEGATIVE
OSMOLALITY SERPL CALC.SUM OF ELEC: 282.9 MOSMOL/KG (ref 275–300)
OXYCODONE, OPI5M: NEGATIVE
PCP UR QL SCN: NEGATIVE
PLATELET # BLD AUTO: 181 THOU/MM3 (ref 130–400)
PMV BLD AUTO: 8.5 FL (ref 9.4–12.4)
POTASSIUM SERPL-SCNC: 4.9 MEQ/L (ref 3.5–5.2)
PROT SERPL-MCNC: 6.3 G/DL (ref 6.1–8)
RBC # BLD AUTO: 3.93 MILL/MM3 (ref 4.7–6.1)
SALICYLATES SERPL-MCNC: < 0.3 MG/DL (ref 2–10)
SEGMENTED NEUTROPHILS ABSOLUTE COUNT: 1.9 THOU/MM3 (ref 1.8–7.7)
SODIUM SERPL-SCNC: 141 MEQ/L (ref 135–145)
TROPONIN, HIGH SENSITIVITY: 24 NG/L (ref 0–12)
TROPONIN, HIGH SENSITIVITY: 26 NG/L (ref 0–12)
TROPONIN, HIGH SENSITIVITY: 29 NG/L (ref 0–12)
WBC # BLD AUTO: 3.2 THOU/MM3 (ref 4.8–10.8)

## 2023-07-25 PROCEDURE — 80143 DRUG ASSAY ACETAMINOPHEN: CPT

## 2023-07-25 PROCEDURE — 2580000003 HC RX 258

## 2023-07-25 PROCEDURE — 1200000003 HC TELEMETRY R&B

## 2023-07-25 PROCEDURE — 80053 COMPREHEN METABOLIC PANEL: CPT

## 2023-07-25 PROCEDURE — 84484 ASSAY OF TROPONIN QUANT: CPT

## 2023-07-25 PROCEDURE — 82077 ASSAY SPEC XCP UR&BREATH IA: CPT

## 2023-07-25 PROCEDURE — 6360000002 HC RX W HCPCS

## 2023-07-25 PROCEDURE — 6370000000 HC RX 637 (ALT 250 FOR IP)

## 2023-07-25 PROCEDURE — 83605 ASSAY OF LACTIC ACID: CPT

## 2023-07-25 PROCEDURE — 93005 ELECTROCARDIOGRAM TRACING: CPT | Performed by: STUDENT IN AN ORGANIZED HEALTH CARE EDUCATION/TRAINING PROGRAM

## 2023-07-25 PROCEDURE — 85025 COMPLETE CBC W/AUTO DIFF WBC: CPT

## 2023-07-25 PROCEDURE — 82248 BILIRUBIN DIRECT: CPT

## 2023-07-25 PROCEDURE — 80179 DRUG ASSAY SALICYLATE: CPT

## 2023-07-25 PROCEDURE — 99285 EMERGENCY DEPT VISIT HI MDM: CPT

## 2023-07-25 PROCEDURE — 93010 ELECTROCARDIOGRAM REPORT: CPT | Performed by: INTERNAL MEDICINE

## 2023-07-25 PROCEDURE — 80307 DRUG TEST PRSMV CHEM ANLYZR: CPT

## 2023-07-25 PROCEDURE — 83690 ASSAY OF LIPASE: CPT

## 2023-07-25 PROCEDURE — 99222 1ST HOSP IP/OBS MODERATE 55: CPT | Performed by: STUDENT IN AN ORGANIZED HEALTH CARE EDUCATION/TRAINING PROGRAM

## 2023-07-25 PROCEDURE — 36415 COLL VENOUS BLD VENIPUNCTURE: CPT

## 2023-07-25 RX ORDER — FAMOTIDINE 20 MG/1
20 TABLET, FILM COATED ORAL NIGHTLY
Status: DISCONTINUED | OUTPATIENT
Start: 2023-07-25 | End: 2023-07-25

## 2023-07-25 RX ORDER — ASPIRIN 81 MG/1
81 TABLET ORAL DAILY
Status: DISCONTINUED | OUTPATIENT
Start: 2023-07-25 | End: 2023-07-28 | Stop reason: HOSPADM

## 2023-07-25 RX ORDER — SODIUM CHLORIDE 9 MG/ML
INJECTION, SOLUTION INTRAVENOUS PRN
Status: DISCONTINUED | OUTPATIENT
Start: 2023-07-25 | End: 2023-07-28 | Stop reason: HOSPADM

## 2023-07-25 RX ORDER — ONDANSETRON 2 MG/ML
4 INJECTION INTRAMUSCULAR; INTRAVENOUS EVERY 6 HOURS PRN
Status: DISCONTINUED | OUTPATIENT
Start: 2023-07-25 | End: 2023-07-28 | Stop reason: HOSPADM

## 2023-07-25 RX ORDER — ALBUTEROL SULFATE 2.5 MG/3ML
2.5 SOLUTION RESPIRATORY (INHALATION) EVERY 6 HOURS PRN
Status: DISCONTINUED | OUTPATIENT
Start: 2023-07-25 | End: 2023-07-28 | Stop reason: HOSPADM

## 2023-07-25 RX ORDER — ENOXAPARIN SODIUM 100 MG/ML
40 INJECTION SUBCUTANEOUS EVERY 24 HOURS
Status: DISCONTINUED | OUTPATIENT
Start: 2023-07-25 | End: 2023-07-28 | Stop reason: HOSPADM

## 2023-07-25 RX ORDER — POLYETHYLENE GLYCOL 3350 17 G/17G
17 POWDER, FOR SOLUTION ORAL DAILY PRN
Status: DISCONTINUED | OUTPATIENT
Start: 2023-07-25 | End: 2023-07-28 | Stop reason: HOSPADM

## 2023-07-25 RX ORDER — ONDANSETRON 4 MG/1
4 TABLET, ORALLY DISINTEGRATING ORAL EVERY 8 HOURS PRN
Status: DISCONTINUED | OUTPATIENT
Start: 2023-07-25 | End: 2023-07-28 | Stop reason: HOSPADM

## 2023-07-25 RX ORDER — VITAMIN E 268 MG
400 CAPSULE ORAL DAILY
Status: DISCONTINUED | OUTPATIENT
Start: 2023-07-25 | End: 2023-07-28 | Stop reason: HOSPADM

## 2023-07-25 RX ORDER — LANOLIN ALCOHOL/MO/W.PET/CERES
1000 CREAM (GRAM) TOPICAL DAILY
Status: DISCONTINUED | OUTPATIENT
Start: 2023-07-25 | End: 2023-07-28 | Stop reason: HOSPADM

## 2023-07-25 RX ORDER — FENOFIBRATE 54 MG/1
54 TABLET ORAL DAILY
Status: DISCONTINUED | OUTPATIENT
Start: 2023-07-25 | End: 2023-07-25 | Stop reason: CLARIF

## 2023-07-25 RX ORDER — ACETAMINOPHEN 325 MG/1
650 TABLET ORAL EVERY 6 HOURS PRN
Status: DISCONTINUED | OUTPATIENT
Start: 2023-07-25 | End: 2023-07-28 | Stop reason: HOSPADM

## 2023-07-25 RX ORDER — MIDODRINE HYDROCHLORIDE 10 MG/1
10 TABLET ORAL 3 TIMES DAILY
Status: DISCONTINUED | OUTPATIENT
Start: 2023-07-25 | End: 2023-07-28 | Stop reason: HOSPADM

## 2023-07-25 RX ORDER — FENOFIBRATE 160 MG/1
160 TABLET ORAL DAILY
Status: DISCONTINUED | OUTPATIENT
Start: 2023-07-25 | End: 2023-07-28 | Stop reason: HOSPADM

## 2023-07-25 RX ORDER — SODIUM CHLORIDE 0.9 % (FLUSH) 0.9 %
5-40 SYRINGE (ML) INJECTION PRN
Status: DISCONTINUED | OUTPATIENT
Start: 2023-07-25 | End: 2023-07-28 | Stop reason: HOSPADM

## 2023-07-25 RX ORDER — ACETAMINOPHEN 650 MG/1
650 SUPPOSITORY RECTAL EVERY 6 HOURS PRN
Status: DISCONTINUED | OUTPATIENT
Start: 2023-07-25 | End: 2023-07-28 | Stop reason: HOSPADM

## 2023-07-25 RX ORDER — FERROUS SULFATE 325(65) MG
325 TABLET ORAL
Status: DISCONTINUED | OUTPATIENT
Start: 2023-07-26 | End: 2023-07-28 | Stop reason: HOSPADM

## 2023-07-25 RX ORDER — EZETIMIBE 10 MG/1
10 TABLET ORAL DAILY
Status: DISCONTINUED | OUTPATIENT
Start: 2023-07-25 | End: 2023-07-28 | Stop reason: HOSPADM

## 2023-07-25 RX ORDER — TAMSULOSIN HYDROCHLORIDE 0.4 MG/1
0.4 CAPSULE ORAL DAILY
Status: DISCONTINUED | OUTPATIENT
Start: 2023-07-25 | End: 2023-07-28 | Stop reason: HOSPADM

## 2023-07-25 RX ORDER — RANOLAZINE 500 MG/1
500 TABLET, EXTENDED RELEASE ORAL DAILY
Status: DISCONTINUED | OUTPATIENT
Start: 2023-07-25 | End: 2023-07-25 | Stop reason: DRUGHIGH

## 2023-07-25 RX ORDER — ROSUVASTATIN CALCIUM 20 MG/1
40 TABLET, COATED ORAL DAILY
Status: DISCONTINUED | OUTPATIENT
Start: 2023-07-25 | End: 2023-07-28 | Stop reason: HOSPADM

## 2023-07-25 RX ORDER — ASCORBIC ACID 500 MG
1000 TABLET ORAL DAILY
Status: DISCONTINUED | OUTPATIENT
Start: 2023-07-25 | End: 2023-07-28 | Stop reason: HOSPADM

## 2023-07-25 RX ORDER — VENLAFAXINE HYDROCHLORIDE 150 MG/1
150 CAPSULE, EXTENDED RELEASE ORAL DAILY
Status: DISCONTINUED | OUTPATIENT
Start: 2023-07-25 | End: 2023-07-28 | Stop reason: HOSPADM

## 2023-07-25 RX ORDER — SODIUM CHLORIDE 9 MG/ML
INJECTION, SOLUTION INTRAVENOUS CONTINUOUS
Status: DISCONTINUED | OUTPATIENT
Start: 2023-07-25 | End: 2023-07-27

## 2023-07-25 RX ORDER — RANOLAZINE 500 MG/1
500 TABLET, EXTENDED RELEASE ORAL 2 TIMES DAILY
Status: DISCONTINUED | OUTPATIENT
Start: 2023-07-25 | End: 2023-07-28 | Stop reason: HOSPADM

## 2023-07-25 RX ORDER — CALCIUM CARBONATE 500(1250)
500 TABLET ORAL DAILY
Status: DISCONTINUED | OUTPATIENT
Start: 2023-07-25 | End: 2023-07-28 | Stop reason: HOSPADM

## 2023-07-25 RX ORDER — PANTOPRAZOLE SODIUM 40 MG/1
40 TABLET, DELAYED RELEASE ORAL
Status: DISCONTINUED | OUTPATIENT
Start: 2023-07-26 | End: 2023-07-28 | Stop reason: HOSPADM

## 2023-07-25 RX ORDER — SODIUM CHLORIDE 0.9 % (FLUSH) 0.9 %
5-40 SYRINGE (ML) INJECTION EVERY 12 HOURS SCHEDULED
Status: DISCONTINUED | OUTPATIENT
Start: 2023-07-25 | End: 2023-07-28 | Stop reason: HOSPADM

## 2023-07-25 RX ADMIN — RANOLAZINE 500 MG: 500 TABLET, EXTENDED RELEASE ORAL at 20:47

## 2023-07-25 RX ADMIN — Medication 1000 MCG: at 20:47

## 2023-07-25 RX ADMIN — CALCIUM 500 MG: 500 TABLET ORAL at 20:48

## 2023-07-25 RX ADMIN — FENOFIBRATE 160 MG: 160 TABLET ORAL at 20:47

## 2023-07-25 RX ADMIN — MIDODRINE HYDROCHLORIDE 10 MG: 10 TABLET ORAL at 20:47

## 2023-07-25 RX ADMIN — OXYCODONE HYDROCHLORIDE AND ACETAMINOPHEN 1000 MG: 500 TABLET ORAL at 20:46

## 2023-07-25 RX ADMIN — ROSUVASTATIN CALCIUM 40 MG: 20 TABLET, FILM COATED ORAL at 20:46

## 2023-07-25 RX ADMIN — TAMSULOSIN HYDROCHLORIDE 0.4 MG: 0.4 CAPSULE ORAL at 20:47

## 2023-07-25 RX ADMIN — EZETIMIBE 10 MG: 10 TABLET ORAL at 20:47

## 2023-07-25 RX ADMIN — ENOXAPARIN SODIUM 40 MG: 100 INJECTION SUBCUTANEOUS at 20:45

## 2023-07-25 RX ADMIN — ASPIRIN 81 MG: 81 TABLET, COATED ORAL at 20:46

## 2023-07-25 RX ADMIN — SODIUM CHLORIDE: 9 INJECTION, SOLUTION INTRAVENOUS at 16:35

## 2023-07-25 NOTE — ED NOTES
Pt transported to [facility] in stable condition. Floor contacted before transport,spoke to [facility].       Read Clyde  07/25/23 8116

## 2023-07-25 NOTE — PROGRESS NOTES
Level A page received. Patient is a 65year old male who presents to the ED voluntarily via EMS. Per ER triage note by Rafy Smallwood, \"Pt presents to the ED via [facility] from home after an ingestion of tramadol. Patient states around 10am this morning he took a handful of tramadol; he is not aware of the mg amount. Pt admits to do ingesting with the \"intent of ending it all\". He reports a combination of depression and martial problems. EKG in process. Patient denies any pain or concerns; he reports feeling tired. VSS. \". Spoke with Dr. Yoli Bates. Poison control is recommending a ten hour observation period. Plan is to admit pt to medicine (medical) with a psych consult. JORGE to sign off at this time.

## 2023-07-25 NOTE — ED NOTES
Pt presents to the ED via [facility] from home after an ingestion of tramadol. Patient states around 10am this morning he took a handful of tramadol; he is not aware of the mg amount. Pt admits to do ingesting with the \"intent of ending it all\". He reports a combination of depression and martial problems. EKG in process. Patient denies any pain or concerns; he reports feeling tired. VSS.       Malika Elizondo RN  07/25/23 0324

## 2023-07-25 NOTE — H&P
Internal Medicine Resident History and Physical          Name: Leticia Watson, male, : 1945, MRN: [id number]    PCP: Liana Groves MD    Date of Admission: 2023  Date of Service: Pt seen/examined on 23  and Admitted to Observation with expected LOS less than two midnights due to medical therapy. Assessment and Plan:  Suicide attempt: pt with history of depression on Effexor. Took a handful of wife's tramadol today. Unknown total amount. Stated he wanted to \"end it all\". Intermittently has these feelings. Once tried to overdose on ASA in college. Denies hearing voices, hallucinations, homicidal ideations. He had no specific plan, said it was a momentary decision because the tramadol was in front of him. Does not have access to firearms. Wife has not put away medications. Patient hemodynamically stable. No respiratory depression. Urine tox negative. Negative Etoh. Negative acetaminophen, oxycodone, salicylate, fentnyl levels. LA 1.1. Continue to monitor patient  Gentle hydration 50ml/hr  Consult to Dr. Mohinder Arizmendi (Psychiatry) for potential psych admission  Of note, neither wife nor patient want to go to geriatric psych facility. Wife says they have a counselor follow up soon and will make sure she goes to that. However, I informed her that may not be possible given situation. Depression: patient has a history of depression and take Effexor. States it normally works for him and has been taking it as he should. He does not do things he used to enjoy and does not want to spend time with his wife. Continue Effexor  Consult to Dr. Mohinder Arizmendi (Psychiatry)    RESHMA on CKD, mild: Cr. 1.3, baseline around 1.0.  Likely due to large amount of toradol consumed  Will given gentle fluids NS 50 ml/hr   BMP in morning      Chronic Conditions (reviewed and stable unless otherwise stated)   HLD: continue rosuvastatin, ezetimibe, fenofibrate  Hx of HTN: not on BP mediation  Hx

## 2023-07-25 NOTE — ED PROVIDER NOTES
every 6 hours as needed for Wheezing    ASCORBIC ACID (VITAMIN C) 1000 MG TABLET    Take 1,000 mg by mouth daily    ASPIRIN 81 MG EC TABLET    Take 81 mg by mouth daily    CALCIUM CARBONATE (OSCAL) 500 MG TABS TABLET    Take 500 mg by mouth daily    CHLORPHENIRAMINE-ACETAMINOPHEN (CORICIDIN HBP COLD/FLU PO)    Take by mouth    CHOLECALCIFEROL (VITAMIN D3) 125 MCG (5000 UT) TABS    Take by mouth    COENZYME Q10 100 MG CAPS CAPSULE    Take 100 mg by mouth daily    EZETIMIBE (ZETIA) 10 MG TABLET    Take 10 mg by mouth daily    FAMOTIDINE (PEPCID) 20 MG TABLET    Take 20 mg by mouth nightly    FENOFIBRATE (TRIGLIDE) 160 MG TABLET    Take 160 mg by mouth daily    FERROUS SULFATE (IRON 325) 325 (65 FE) MG TABLET    Take 325 mg by mouth daily (with breakfast)    MIDODRINE (PROAMATINE) 10 MG TABLET    take 1 tablet by mouth three times a day    MISC NATURAL PRODUCTS (GLUCOSAMINE CHOND CMP ADVANCED PO)    Take by mouth    MULTIPLE VITAMINS-MINERALS (HAIR SKIN & NAILS ADVANCED) TABS    Take by mouth    NITROGLYCERIN (NITROSTAT) 0.4 MG SL TABLET    Place 0.4 mg under the tongue    OMEPRAZOLE (PRILOSEC) 20 MG DELAYED RELEASE CAPSULE    Take 20 mg by mouth daily    PACERONE 200 MG TABLET        POLYETHYLENE GLYCOL (GLYCOLAX) 17 GM/SCOOP POWDER    Take 17 g by mouth daily    RANOLAZINE (RANEXA) 500 MG EXTENDED RELEASE TABLET    Take 500 mg by mouth    ROSUVASTATIN (CRESTOR) 40 MG TABLET    Take 40 mg by mouth daily    TAMSULOSIN (FLOMAX) 0.4 MG CAPSULE    Take 0.4 mg by mouth daily    VENLAFAXINE (EFFEXOR XR) 150 MG EXTENDED RELEASE CAPSULE    Take 150 mg by mouth daily    VITAMIN B-12 (CYANOCOBALAMIN) 1000 MCG TABLET    Take 1,000 mcg by mouth daily    VITAMIN E 400 UNIT CAPSULE    Take 400 Units by mouth daily       ALLERGIES     is allergic to losartan. FAMILY HISTORY     He indicated that his mother is . He indicated that his father is . He indicated that his sister is alive.  He indicated that his brother

## 2023-07-25 NOTE — PROGRESS NOTES
[address] [place] [address]) Team notified of results of the C-SSRS screening and the need for further evaluation of patient. Discussed suicide precautions with patient before implementation. Patient notified that they will be observed at all times, including toileting/bathing. Visitors will be screened and may be limited at the discretion of the nurse. Visitor belongings are subject to be searched. Belongings may not be allowed into the patient room. Personal belongings checked by [facility] RN and Amando Teran RN in the presence of the patient. Belongings believed to be a threat to patient safety removed from room. Belongings removed include:  n/a   Placed in secure area n/a . Room screened for safety, items removed to create a safe environment include:   Blood pressure cuff   Loose or extra cords, tubing (not of medical necessity)   Extra Linens   Telephone   Toiletries   Trash liners   Patient's cell phone and charging cord (must be removed from room)       Safety tray ordered: yes    Expectations were discussed with sitter (unit support aide). Sitter positioned near exit. Sitter reminded that patient should be observed at all times including toileting and bathing. Sitter has security radio and documentation sheet. Patient and sitter included in hourly rounds.

## 2023-07-25 NOTE — PROGRESS NOTES
Placed call to patients room, role and self identified, services explained and accepted. Virtual Nurse completed admission required documents. Reviewed fall packet and use of call light. Patients wife to bring home med list tomorrow.

## 2023-07-25 NOTE — PROGRESS NOTES
Pt admitted to  [id number] 18 [id number] 19 in a wheelchair. Complaints: suicide attempt. IV .9 infusing into the forearm right, condition patent and no redness at a rate of 50 mls/ hour with about 900 mls in the bag still. IV site free of s/s of infection or infiltration. Vital signs obtained. Assessment and data collection initiated. Two nurse skin assessment performed by Ines JIN and Eliza Homans RN. Oriented to room. Policies and procedures for 6K explained. Ines JIN discussed hourly rounding with patient addressing 5 P's. Fall prevention and safety brochure discussed with patient. Bed alarm on. Call light in reach.

## 2023-07-26 PROBLEM — T14.91XA SUICIDE ATTEMPT (HCC): Status: ACTIVE | Noted: 2023-07-26

## 2023-07-26 LAB
ANION GAP SERPL CALC-SCNC: 10 MEQ/L (ref 8–16)
BUN SERPL-MCNC: 20 MG/DL (ref 7–22)
CALCIUM SERPL-MCNC: 8.9 MG/DL (ref 8.5–10.5)
CHLORIDE SERPL-SCNC: 103 MEQ/L (ref 98–111)
CO2 SERPL-SCNC: 27 MEQ/L (ref 23–33)
CREAT SERPL-MCNC: 1.1 MG/DL (ref 0.4–1.2)
DEPRECATED RDW RBC AUTO: 48.3 FL (ref 35–45)
ERYTHROCYTE [DISTWIDTH] IN BLOOD BY AUTOMATED COUNT: 13.8 % (ref 11.5–14.5)
FLUAV RNA RESP QL NAA+PROBE: NOT DETECTED
FLUBV RNA RESP QL NAA+PROBE: NOT DETECTED
GFR SERPL CREATININE-BSD FRML MDRD: > 60 ML/MIN/1.73M2
GLUCOSE SERPL-MCNC: 100 MG/DL (ref 70–108)
HCT VFR BLD AUTO: 37.1 % (ref 42–52)
HGB BLD-MCNC: 11.7 GM/DL (ref 14–18)
MCH RBC QN AUTO: 30 PG (ref 26–33)
MCHC RBC AUTO-ENTMCNC: 31.5 GM/DL (ref 32.2–35.5)
MCV RBC AUTO: 95.1 FL (ref 80–94)
PLATELET # BLD AUTO: 199 THOU/MM3 (ref 130–400)
PMV BLD AUTO: 8.7 FL (ref 9.4–12.4)
POTASSIUM SERPL-SCNC: 3.9 MEQ/L (ref 3.5–5.2)
RBC # BLD AUTO: 3.9 MILL/MM3 (ref 4.7–6.1)
SARS-COV-2 RNA RESP QL NAA+PROBE: NOT DETECTED
SODIUM SERPL-SCNC: 140 MEQ/L (ref 135–145)
WBC # BLD AUTO: 3.6 THOU/MM3 (ref 4.8–10.8)

## 2023-07-26 PROCEDURE — 85027 COMPLETE CBC AUTOMATED: CPT

## 2023-07-26 PROCEDURE — 80048 BASIC METABOLIC PNL TOTAL CA: CPT

## 2023-07-26 PROCEDURE — 2580000003 HC RX 258

## 2023-07-26 PROCEDURE — 93005 ELECTROCARDIOGRAM TRACING: CPT

## 2023-07-26 PROCEDURE — 36415 COLL VENOUS BLD VENIPUNCTURE: CPT

## 2023-07-26 PROCEDURE — 6360000002 HC RX W HCPCS

## 2023-07-26 PROCEDURE — 1200000003 HC TELEMETRY R&B

## 2023-07-26 PROCEDURE — 6370000000 HC RX 637 (ALT 250 FOR IP)

## 2023-07-26 PROCEDURE — 87636 SARSCOV2 & INF A&B AMP PRB: CPT

## 2023-07-26 RX ADMIN — VENLAFAXINE HYDROCHLORIDE 150 MG: 150 CAPSULE, EXTENDED RELEASE ORAL at 08:50

## 2023-07-26 RX ADMIN — ENOXAPARIN SODIUM 40 MG: 100 INJECTION SUBCUTANEOUS at 20:40

## 2023-07-26 RX ADMIN — EZETIMIBE 10 MG: 10 TABLET ORAL at 08:50

## 2023-07-26 RX ADMIN — ONDANSETRON 4 MG: 2 INJECTION INTRAMUSCULAR; INTRAVENOUS at 05:08

## 2023-07-26 RX ADMIN — OXYCODONE HYDROCHLORIDE AND ACETAMINOPHEN 1000 MG: 500 TABLET ORAL at 08:50

## 2023-07-26 RX ADMIN — MIDODRINE HYDROCHLORIDE 10 MG: 10 TABLET ORAL at 12:05

## 2023-07-26 RX ADMIN — PANTOPRAZOLE SODIUM 40 MG: 40 TABLET, DELAYED RELEASE ORAL at 06:22

## 2023-07-26 RX ADMIN — TAMSULOSIN HYDROCHLORIDE 0.4 MG: 0.4 CAPSULE ORAL at 08:50

## 2023-07-26 RX ADMIN — FERROUS SULFATE TAB 325 MG (65 MG ELEMENTAL FE) 325 MG: 325 (65 FE) TAB at 08:50

## 2023-07-26 RX ADMIN — RANOLAZINE 500 MG: 500 TABLET, EXTENDED RELEASE ORAL at 08:50

## 2023-07-26 RX ADMIN — RANOLAZINE 500 MG: 500 TABLET, EXTENDED RELEASE ORAL at 20:40

## 2023-07-26 RX ADMIN — ASPIRIN 81 MG: 81 TABLET, COATED ORAL at 08:50

## 2023-07-26 RX ADMIN — Medication 1000 MCG: at 08:50

## 2023-07-26 RX ADMIN — FENOFIBRATE 160 MG: 160 TABLET ORAL at 08:50

## 2023-07-26 RX ADMIN — ROSUVASTATIN CALCIUM 40 MG: 20 TABLET, FILM COATED ORAL at 08:50

## 2023-07-26 RX ADMIN — SODIUM CHLORIDE: 9 INJECTION, SOLUTION INTRAVENOUS at 13:12

## 2023-07-26 RX ADMIN — CALCIUM 500 MG: 500 TABLET ORAL at 08:50

## 2023-07-26 NOTE — CONSULTS
Department of Psychiatry   Psychiatric Assessment      Thank you very much for allowing us to participate in the care of this patient. Reason for Consult:  suicide attempt    HISTORY OF PRESENT ILLNESS:          The patient is a 65 y.o. male with significant history of depression who is admitted medically for an overdose on Tramadol suicide attempt. Per JORGE note,   \"Patient is a 65year old male who presents to the ED voluntarily via EMS. Per ER triage note by Kasia Gonzalez, \"Pt presents to the ED via [facility] from home after an ingestion of tramadol. Patient states around 10am this morning he took a handful of tramadol; he is not aware of the mg amount. Pt admits to do ingesting with the \"intent of ending it all\". He reports a combination of depression and martial problems. EKG in process. Patient denies any pain or concerns; he reports feeling tired. VSS. \". Spoke with Dr. Johny Kelly. Poison control is recommending a ten hour observation period. Plan is to admit pt to medicine (medical) with a psych consult. JORGE to sign off at this time. \"    On interview today,   Thereasa Argue reported he and his wife have been having marital problems for some time now that he believes he has caused. He said, \"I have screwed up so much and just wanted to end it all. \" He knew his wife had a prescription for opiates so he took them with a glass of orange juice and laid down. He then stated, \"I did not expect to wake up and be here. I screwed up. \" When I probed into how he felt he screwed up his marriage he described not being the  his wife thought he would be. He was initially  to this wife in [place] but then walked out on her in 1969 and filed for a divorce shortly after. He then was remarried two separate times in the time since then. His second marriage ended in a divorce. His third marriage he felt was the best one and happiest marriage.  Unfortunately, his third wife passed away in 2019 and that was a very traumatic

## 2023-07-26 NOTE — FLOWSHEET NOTE
07/26/23 1024   Safe Environment   Safety Measures Bed in low position;Caregiver at bedside;Side rails X 2;Standard Safety Measures  (Virtual nurse safety round completed.)     Sitter at the bedside. Patient sleeping. No distress noted.

## 2023-07-26 NOTE — PROGRESS NOTES
Reinforced suicide precautions with patient. Reinforced that visitors will be screened and may be limited at the discretion of the nurse. Visitor belongings are subject to be searched. Belongings may not be allowed into the patient room. Reviewed any personal belongings from the previous shift believed to be a threat to patient safety removed from room. Belongings removed include:  all patient belongings   Placed in secure locked cabinet in patient's room . Room screened for safety, items removed to create a safe environment include:   Blood pressure cuff   Loose or extra cords, tubing (not of medical necessity)   Extra Linens   Telephone   Toiletries   Trash Liners   Patient's cell phone and charging cord (must be removed from room)      Safety tray ordered: yes    Expectations were discussed with sitter (unit support aide). Sitter positioned near exit. Sitter reminded that patient should be observed at all times including toileting and bathing. Sitter has  documentation sheet. Patient and sitter included in hourly rounds.

## 2023-07-27 VITALS
HEIGHT: 66 IN | RESPIRATION RATE: 16 BRPM | DIASTOLIC BLOOD PRESSURE: 82 MMHG | HEART RATE: 60 BPM | BODY MASS INDEX: 28.49 KG/M2 | TEMPERATURE: 97.9 F | OXYGEN SATURATION: 95 % | WEIGHT: 177.25 LBS | SYSTOLIC BLOOD PRESSURE: 128 MMHG

## 2023-07-27 PROBLEM — F33.2 SEVERE RECURRENT MAJOR DEPRESSION WITHOUT PSYCHOTIC FEATURES (HCC): Status: ACTIVE | Noted: 2023-07-27

## 2023-07-27 LAB
ANION GAP SERPL CALC-SCNC: 9 MEQ/L (ref 8–16)
BUN SERPL-MCNC: 16 MG/DL (ref 7–22)
CALCIUM SERPL-MCNC: 8.6 MG/DL (ref 8.5–10.5)
CHLORIDE SERPL-SCNC: 104 MEQ/L (ref 98–111)
CO2 SERPL-SCNC: 26 MEQ/L (ref 23–33)
CREAT SERPL-MCNC: 1.1 MG/DL (ref 0.4–1.2)
DEPRECATED RDW RBC AUTO: 48 FL (ref 35–45)
EKG ATRIAL RATE: 63 BPM
EKG P AXIS: 81 DEGREES
EKG P-R INTERVAL: 206 MS
EKG Q-T INTERVAL: 432 MS
EKG QRS DURATION: 108 MS
EKG QTC CALCULATION (BAZETT): 442 MS
EKG R AXIS: 62 DEGREES
EKG T AXIS: 96 DEGREES
EKG VENTRICULAR RATE: 63 BPM
ERYTHROCYTE [DISTWIDTH] IN BLOOD BY AUTOMATED COUNT: 13.6 % (ref 11.5–14.5)
GFR SERPL CREATININE-BSD FRML MDRD: > 60 ML/MIN/1.73M2
GLUCOSE SERPL-MCNC: 97 MG/DL (ref 70–108)
HCT VFR BLD AUTO: 35.1 % (ref 42–52)
HGB BLD-MCNC: 11.1 GM/DL (ref 14–18)
MCH RBC QN AUTO: 30.3 PG (ref 26–33)
MCHC RBC AUTO-ENTMCNC: 31.6 GM/DL (ref 32.2–35.5)
MCV RBC AUTO: 95.9 FL (ref 80–94)
PLATELET # BLD AUTO: 181 THOU/MM3 (ref 130–400)
PMV BLD AUTO: 8.8 FL (ref 9.4–12.4)
POTASSIUM SERPL-SCNC: 4 MEQ/L (ref 3.5–5.2)
RBC # BLD AUTO: 3.66 MILL/MM3 (ref 4.7–6.1)
SODIUM SERPL-SCNC: 139 MEQ/L (ref 135–145)
WBC # BLD AUTO: 2.8 THOU/MM3 (ref 4.8–10.8)

## 2023-07-27 PROCEDURE — 6370000000 HC RX 637 (ALT 250 FOR IP)

## 2023-07-27 PROCEDURE — 6360000002 HC RX W HCPCS

## 2023-07-27 PROCEDURE — 85027 COMPLETE CBC AUTOMATED: CPT

## 2023-07-27 PROCEDURE — 80048 BASIC METABOLIC PNL TOTAL CA: CPT

## 2023-07-27 PROCEDURE — 93010 ELECTROCARDIOGRAM REPORT: CPT | Performed by: INTERNAL MEDICINE

## 2023-07-27 PROCEDURE — 36415 COLL VENOUS BLD VENIPUNCTURE: CPT

## 2023-07-27 PROCEDURE — 2580000003 HC RX 258

## 2023-07-27 RX ADMIN — SODIUM CHLORIDE, PRESERVATIVE FREE 10 ML: 5 INJECTION INTRAVENOUS at 08:33

## 2023-07-27 RX ADMIN — MIDODRINE HYDROCHLORIDE 10 MG: 10 TABLET ORAL at 08:32

## 2023-07-27 RX ADMIN — RANOLAZINE 500 MG: 500 TABLET, EXTENDED RELEASE ORAL at 20:55

## 2023-07-27 RX ADMIN — Medication 1000 MCG: at 08:32

## 2023-07-27 RX ADMIN — FERROUS SULFATE TAB 325 MG (65 MG ELEMENTAL FE) 325 MG: 325 (65 FE) TAB at 08:32

## 2023-07-27 RX ADMIN — RANOLAZINE 500 MG: 500 TABLET, EXTENDED RELEASE ORAL at 08:32

## 2023-07-27 RX ADMIN — CALCIUM 500 MG: 500 TABLET ORAL at 08:32

## 2023-07-27 RX ADMIN — PANTOPRAZOLE SODIUM 40 MG: 40 TABLET, DELAYED RELEASE ORAL at 05:29

## 2023-07-27 RX ADMIN — VENLAFAXINE HYDROCHLORIDE 150 MG: 150 CAPSULE, EXTENDED RELEASE ORAL at 08:32

## 2023-07-27 RX ADMIN — EZETIMIBE 10 MG: 10 TABLET ORAL at 08:32

## 2023-07-27 RX ADMIN — OXYCODONE HYDROCHLORIDE AND ACETAMINOPHEN 1000 MG: 500 TABLET ORAL at 08:32

## 2023-07-27 RX ADMIN — ENOXAPARIN SODIUM 40 MG: 100 INJECTION SUBCUTANEOUS at 20:56

## 2023-07-27 RX ADMIN — ASPIRIN 81 MG: 81 TABLET, COATED ORAL at 08:32

## 2023-07-27 RX ADMIN — ROSUVASTATIN CALCIUM 40 MG: 20 TABLET, FILM COATED ORAL at 08:32

## 2023-07-27 RX ADMIN — TAMSULOSIN HYDROCHLORIDE 0.4 MG: 0.4 CAPSULE ORAL at 08:32

## 2023-07-27 RX ADMIN — FENOFIBRATE 160 MG: 160 TABLET ORAL at 08:32

## 2023-07-27 ASSESSMENT — PAIN SCALES - GENERAL
PAINLEVEL_OUTOF10: 0

## 2023-07-27 NOTE — PROGRESS NOTES
Patients cardizem titrated up to 10 mg/hr per parameters, patients /75 's, notified hospitalist, orders for 500 ml LR bolus at this time.

## 2023-07-27 NOTE — CARE COORDINATION
7/27/23, 12:00 PM EDT    Patient goals/plan/ treatment preferences discussed by  and . Patient goals/plan/ treatment preferences reviewed with patient/ family. Patient/ family verbalize understanding of discharge plan and are in agreement with goal/plan/treatment preferences. Understanding was demonstrated using the teach back method. AVS provided by RN at time of discharge, which includes all necessary medical information pertaining to the patients current course of illness, treatment, post-discharge goals of care, and treatment preferences. Has been accepted at [facility]. [facility].      Services At/After Discharge: [address] [facility]       IMM Letter  IMM Letter given to Patient/Family/Significant other/Guardian/POA/by[de-identified] Cherise Boast RN Case Manager  IMM Letter date given[de-identified] 07/26/23  IMM Letter time given[de-identified] [phone]
HH. Possible [facility]? Transportation/Food Security/Housekeeping Addressed: No issues identified.      Gypsy Cole RN  Case Management Department

## 2023-07-27 NOTE — DISCHARGE SUMMARY
Resident Discharge Summary (Hospitalist)      Patient Identification:   Joanne Salcedo   : 1945  MRN: [id number]   Account: [de-identified]   Patient's PCP: Woody Smiley MD    Admit Date: 2023   Discharge Date:  23    Admitting Physician: No admitting provider for patient encounter. Discharge Physician: Ilsa Copeland DO       Discharge Diagnoses:  Suicide attempt: pt with history of depression on Effexor. Took a handful of wife's tramadol today. Unknown total amount. Stated he wanted to \"end it all\". Intermittently has these feelings. Once tried to overdose on ASA in college. Denies hearing voices, hallucinations, homicidal ideations. He had no specific plan, said it was a momentary decision because the tramadol was in front of him. Does not have access to firearms. Wife has now put away medications. Patient hemodynamically stable. No respiratory depression. Urine tox negative. Negative Etoh. Negative acetaminophen, oxycodone, salicylate, fentnyl levels. LA 1.1. BMP today grossly normal. Patient eating breakfast this morning. Repeat EKG today with questionable t-wave abnormalities the same as on admission. Likely baseline for patient. He is medically cleared at this time for psych admission  [address] [place] [address] accepted patient for Geriatric Psych Admission     Depression: patient has a history of depression and take Effexor. States it normally works for him and has been taking it as he should. He does not do things he used to enjoy and does not want to spend time with his wife. Continue [id number] [facility] accepted patient for Geriatric Psych Admission     RESHMA on CKD, Resolved: Cr. 1.3, baseline around 1.0. Likely due to large amount of toradol consumed. Repeat Cr today 1.1 after fluids overnight.          Chronic Conditions (reviewed and stable unless otherwise stated)   HLD: continue rosuvastatin, ezetimibe, fenofibrate  Hx of HTN: not on BP mediation  Hx of hypotension: continue

## 2023-07-27 NOTE — PROGRESS NOTES
Internal Medicine Resident Progress Note    Name: Van Jose, male, : 1945, MRN: [id number]    PCP: Mason Nice MD    Date of Admission: 2023  Date of Service: Pt seen/examined on 23      PATIENT IS MEDICALLY CLEARED FOR GERIATRIC PSYCH ADMISSION      Assessment/Plan:  Suicide attempt: pt with history of depression on Effexor. Took a handful of wife's tramadol today. Unknown total amount. Stated he wanted to \"end it all\". Intermittently has these feelings. Once tried to overdose on ASA in college. Denies hearing voices, hallucinations, homicidal ideations. He had no specific plan, said it was a momentary decision because the tramadol was in front of him. Does not have access to firearms. Wife has not put away medications. Patient hemodynamically stable. No respiratory depression. Urine tox negative. Negative Etoh. Negative acetaminophen, oxycodone, salicylate, fentnyl levels. LA 1.1. BMP today grossly normal. Patient eating breakfast this morning. Repeat EKG today with questionable t-wave abnormalities the same as on admission. Likely baseline for patient. Per psych note, patient is a candidate for geriatric psych admission  He is medically cleared at this time for psych admission     Depression: patient has a history of depression and take Effexor. States it normally works for him and has been taking it as he should. He does not do things he used to enjoy and does not want to spend time with his wife. Continue Effexor  Consult to Dr. Onesimo Rodriguez (Psychiatry) - per note, patient is candidate for geriatric psych admission. RESHMA on CKD, Resolved: Cr. 1.3, baseline around 1.0. Likely due to large amount of toradol consumed. Repeat Cr today 1.0 after fluids overnight.          Chronic Conditions (reviewed and stable unless otherwise stated)   HLD: continue rosuvastatin, ezetimibe, fenofibrate  Hx of HTN: not on BP mediation  Hx of hypotension: continue midodrine  CAD s/p

## 2023-07-27 NOTE — PROGRESS NOTES
Patient previously lost IV access when returning from CT scan, IV team unable to get IV, 3 ER RN's attempted with success after multiple attempts. Patient just removed only working IV at this time, patient with a second IV but continues to occlude, call placed to ER to place another IV, will await. Virtual  ordered per protocol and placed at bedside at this time.

## 2023-07-27 NOTE — PLAN OF CARE
Problem: Discharge Planning  Goal: Discharge to home or other facility with appropriate resources  Outcome: Progressing  Flowsheets (Taken 7/27/2023 0256)  Discharge to home or other facility with appropriate resources:   Identify barriers to discharge with patient and caregiver   Arrange for needed discharge resources and transportation as appropriate   Identify discharge learning needs (meds, wound care, etc)   Arrange for interpreters to assist at discharge as needed   Refer to discharge planning if patient needs post-hospital services based on physician order or complex needs related to functional status, cognitive ability or social support system     Problem: ABCDS Injury Assessment  Goal: Absence of physical injury  Outcome: Progressing  Flowsheets (Taken 7/27/2023 0256)  Absence of Physical Injury: Implement safety measures based on patient assessment     Problem: Self Harm/Suicidality  Goal: Will have no self-injury during hospital stay  Description: INTERVENTIONS:  1. Ensure constant observer at bedside with Q15M safety checks  2. Maintain a safe environment  3. Secure patient belongings  4. Ensure family/visitors adhere to safety recommendations  5. Ensure safety tray has been added to patient's diet order  6. Every shift and PRN: Re-assess suicidal risk via Frequent Screener    Outcome: Progressing  Flowsheets (Taken 7/27/2023 0256)  Will have no self-injury during hospital stay:   Ensure constant observer at bedside with Q15M safety checks   Maintain a safe environment   Secure patient belongings   Ensure family/visitors adhere to safety recommendations   Ensure safety tray has been added to patient's diet order   Every shift and PRN: Re-assess suicidal risk via Frequent Screener  Note: With human sitter      Problem: Depression  Goal: Will be euthymic at discharge  Description: INTERVENTIONS:  1. Administer medication as ordered  2. Provide emotional support via 1:1 interaction with staff  3.

## 2023-07-27 NOTE — PROGRESS NOTES
Call from poison control, update given on vitals, labs and patient status. Per poison control, patients case is closed and medically cleared, notified provider.

## 2023-07-27 NOTE — PROGRESS NOTES
Pt was in bed as his wife and a sitter were with him. He was dealing with drug overdose intentional self-harm. He was encouraged and blessed.     07/27/23 1530   Encounter Summary   Encounter Overview/Reason  Initial Encounter   Service Provided For: Patient and family together   Referral/Consult From: [facility]   Support System Spouse   Last Encounter  07/27/23   Complexity of Encounter Low   Begin Time 1409   End Time  1416   Total Time Calculated 7 min   Spiritual/Emotional needs   Type Spiritual Support   Assessment/Intervention/Outcome   Assessment Hopeful   Intervention Empowerment

## 2023-08-04 LAB
EKG ATRIAL RATE: 63 BPM
EKG P AXIS: 81 DEGREES
EKG P-R INTERVAL: 206 MS
EKG Q-T INTERVAL: 432 MS
EKG QRS DURATION: 108 MS
EKG QTC CALCULATION (BAZETT): 442 MS
EKG R AXIS: 62 DEGREES
EKG T AXIS: 96 DEGREES
EKG VENTRICULAR RATE: 63 BPM